# Patient Record
Sex: FEMALE | Race: WHITE | URBAN - METROPOLITAN AREA
[De-identification: names, ages, dates, MRNs, and addresses within clinical notes are randomized per-mention and may not be internally consistent; named-entity substitution may affect disease eponyms.]

---

## 2023-10-02 ENCOUNTER — OFFICE VISIT (OUTPATIENT)
Age: 68
End: 2023-10-02

## 2023-10-02 VITALS
BODY MASS INDEX: 32.36 KG/M2 | OXYGEN SATURATION: 98 % | HEART RATE: 85 BPM | TEMPERATURE: 97.8 F | DIASTOLIC BLOOD PRESSURE: 80 MMHG | WEIGHT: 171.4 LBS | SYSTOLIC BLOOD PRESSURE: 151 MMHG | HEIGHT: 61 IN

## 2023-10-02 DIAGNOSIS — B37.2 CANDIDIASIS OF SKIN: ICD-10-CM

## 2023-10-02 DIAGNOSIS — B02.9 HERPES ZOSTER WITHOUT COMPLICATION: Primary | ICD-10-CM

## 2023-10-02 RX ORDER — VALACYCLOVIR HYDROCHLORIDE 1 G/1
1000 TABLET, FILM COATED ORAL 3 TIMES DAILY
Qty: 30 TABLET | Refills: 0 | Status: SHIPPED | OUTPATIENT
Start: 2023-10-02 | End: 2023-10-12

## 2023-10-02 RX ORDER — CLOTRIMAZOLE AND BETAMETHASONE DIPROPIONATE 10; .64 MG/G; MG/G
CREAM TOPICAL
Qty: 45 G | Refills: 1 | Status: SHIPPED | OUTPATIENT
Start: 2023-10-02

## 2023-10-02 ASSESSMENT — ENCOUNTER SYMPTOMS
VOMITING: 0
DIARRHEA: 0
COUGH: 0
SHORTNESS OF BREATH: 0
EYE PAIN: 0
RHINORRHEA: 0
SORE THROAT: 0

## 2024-02-15 ENCOUNTER — OFFICE VISIT (OUTPATIENT)
Age: 69
End: 2024-02-15

## 2024-02-15 VITALS
SYSTOLIC BLOOD PRESSURE: 140 MMHG | RESPIRATION RATE: 17 BRPM | OXYGEN SATURATION: 97 % | TEMPERATURE: 98 F | BODY MASS INDEX: 30 KG/M2 | HEIGHT: 62 IN | WEIGHT: 163 LBS | HEART RATE: 79 BPM | DIASTOLIC BLOOD PRESSURE: 82 MMHG

## 2024-02-15 DIAGNOSIS — I10 ELEVATED BLOOD PRESSURE READING IN OFFICE WITH DIAGNOSIS OF HYPERTENSION: ICD-10-CM

## 2024-02-15 DIAGNOSIS — Z02.0 SCHOOL PHYSICAL EXAM: Primary | ICD-10-CM

## 2024-02-15 NOTE — PROGRESS NOTES
Ana Cervantes (:  1955) is a 69 y.o. female,Established patient, here for evaluation of the following chief complaint(s):  school physcial  (She is going to school for med tech )      ASSESSMENT/PLAN:    ICD-10-CM    1. School physical exam  Z02.0       2. Elevated blood pressure reading in office with diagnosis of hypertension  I10 External Referral To Primary Care        Based on patient's limited examination in the clinic today, she is medically cleared for medical technician school. Paperwork was filled out, scanned into her chart, and returned to her.     Her blood pressure is slightly elevated in the clinic today. She is currently on Lisinopril and states that it is typically normal, but she was nervous with running to different places for her school requirements today. Encouraged her to monitor this and follow up with her PCP as needed. She is understanding and agreeable to this plan.     Education and handout provided on diagnosis and management of symptoms.   AVS reviewed with patient. Follow up as needed in UC or with PCP for new or worsening symptoms.   Return if symptoms worsen or fail to improve.    SUBJECTIVE/OBJECTIVE:  Patient presents to the clinic for a physical for school. 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        History provided by:  Patient   used: No        Vitals:    02/15/24 1152 02/15/24 1218   BP: (!) 152/92 (!) 140/82   Pulse: 79    Resp: 17    Temp: 98 °F (36.7 °C)    SpO2: 97%    Weight: 73.9 kg (163 lb)    Height: 1.575 m (5' 2\")        Review of Systems   Constitutional:  Negative for fatigue.   Respiratory:  Negative for cough, chest tightness and shortness of breath.    Cardiovascular:  Negative for chest pain, palpitations and leg swelling.   Gastrointestinal:  Negative for constipation, diarrhea and vomiting.   Genitourinary:  Negative for difficulty urinating.   Neurological:  Negative for syncope,